# Patient Record
Sex: MALE | Race: WHITE | NOT HISPANIC OR LATINO | Employment: FULL TIME | URBAN - METROPOLITAN AREA
[De-identification: names, ages, dates, MRNs, and addresses within clinical notes are randomized per-mention and may not be internally consistent; named-entity substitution may affect disease eponyms.]

---

## 2017-01-03 ENCOUNTER — APPOINTMENT (OUTPATIENT)
Dept: GENERAL RADIOLOGY | Facility: CLINIC | Age: 47
End: 2017-01-03
Attending: FAMILY MEDICINE
Payer: COMMERCIAL

## 2017-01-03 ENCOUNTER — HOSPITAL ENCOUNTER (EMERGENCY)
Facility: CLINIC | Age: 47
Discharge: HOME | End: 2017-01-03
Attending: FAMILY MEDICINE | Admitting: FAMILY MEDICINE
Payer: COMMERCIAL

## 2017-01-03 VITALS
RESPIRATION RATE: 18 BRPM | SYSTOLIC BLOOD PRESSURE: 143 MMHG | OXYGEN SATURATION: 99 % | HEART RATE: 76 BPM | HEIGHT: 71 IN | TEMPERATURE: 97.3 F | BODY MASS INDEX: 29.26 KG/M2 | DIASTOLIC BLOOD PRESSURE: 98 MMHG | WEIGHT: 209 LBS

## 2017-01-03 DIAGNOSIS — S29.012A STRAIN OF THORACIC PARASPINAL MUSCLES EXCLUDING T1 AND T2 LEVELS, INITIAL ENCOUNTER: ICD-10-CM

## 2017-01-03 PROCEDURE — 99284 EMERGENCY DEPT VISIT MOD MDM: CPT | Performed by: FAMILY MEDICINE

## 2017-01-03 PROCEDURE — 99283 EMERGENCY DEPT VISIT LOW MDM: CPT

## 2017-01-03 PROCEDURE — 71101 X-RAY EXAM UNILAT RIBS/CHEST: CPT | Mod: TC,LT

## 2017-01-03 RX ORDER — CYCLOBENZAPRINE HCL 5 MG
5 TABLET ORAL 3 TIMES DAILY PRN
Qty: 20 TABLET | Refills: 0 | Status: SHIPPED | OUTPATIENT
Start: 2017-01-03 | End: 2017-01-09

## 2017-01-03 RX ORDER — KETOROLAC TROMETHAMINE 30 MG/ML
60 INJECTION, SOLUTION INTRAMUSCULAR; INTRAVENOUS ONCE
Status: DISCONTINUED | OUTPATIENT
Start: 2017-01-03 | End: 2017-01-03 | Stop reason: HOSPADM

## 2017-01-03 RX ORDER — IBUPROFEN 800 MG/1
800 TABLET, FILM COATED ORAL EVERY 8 HOURS PRN
Qty: 30 TABLET | Refills: 0 | Status: SHIPPED | OUTPATIENT
Start: 2017-01-03 | End: 2019-06-22

## 2017-01-03 RX ORDER — OXYCODONE AND ACETAMINOPHEN 5; 325 MG/1; MG/1
1-2 TABLET ORAL EVERY 4 HOURS PRN
Qty: 20 TABLET | Refills: 0 | Status: SHIPPED | OUTPATIENT
Start: 2017-01-03 | End: 2019-06-22

## 2017-01-03 ASSESSMENT — ENCOUNTER SYMPTOMS
BACK PAIN: 1
NECK PAIN: 0

## 2017-01-03 NOTE — ED PROVIDER NOTES
"  History     Chief Complaint   Patient presents with     Back Pain     The history is provided by the patient.     Bill Vogel is a 46 year old male who presents with posterior left rib pain. The patient fell on some ice around 1500 and landed on his left side. Since then he has been experiencing spasms of pain in his ribs that \"take his breath away\". He tried taking some of his wife's oxycodone yesterday and this improved his pain, but today it increased to a 10/10 even after taking oxycodone. His pain is not worsened by deep breaths or movement but seems to brought on when he talks to loudly. He denies any other injuries.     I have reviewed the Medications, Allergies, Past Medical and Surgical History, and Social History in the Epic system.    Patient Active Problem List   Diagnosis     Acute reaction to stress     Palpitations     Allergic rhinitis     Tobacco abuse     Radiculopathy of cervical spine     CARDIOVASCULAR SCREENING; LDL GOAL LESS THAN 160     Past Medical History   Diagnosis Date     Tobacco abuse        Past Surgical History   Procedure Laterality Date     Orthopedic surgery         Family History   Problem Relation Age of Onset     Depression Mother      Depression Sister      Breast Cancer Mother        Social History   Substance Use Topics     Smoking status: Current Every Day Smoker -- 1.00 packs/day for 22 years     Smokeless tobacco: Former User     Quit date: 12/02/2015     Alcohol Use: No        Immunization History   Administered Date(s) Administered     Influenza (IIV3) 11/13/2003     Influenza Vaccine IM 3yrs+ 4 Valent IIV4 10/28/2013     Mantoux 08/25/2008     TD (ADULT, 7+) 06/08/2006     TDAP (BOOSTRIX AGES 10-64) 10/28/2013          Allergies   Allergen Reactions     No Known Drug Allergies      Seasonal Allergies        Current Outpatient Prescriptions   Medication Sig Dispense Refill     OXYCODONE HCL PO Take 5 mg by mouth Taken some of his wife's       IBUPROFEN PO Take 800 mg " "by mouth every 8 hours as needed          Review of Systems   Musculoskeletal: Positive for back pain (posterior left ribs). Negative for neck pain.   All other systems reviewed and are negative.      Physical Exam   BP: (!) 143/98 mmHg  Pulse: 76  Temp: 97.3  F (36.3  C)  Resp: 18  Height: 180.3 cm (5' 11\")  Weight: 94.802 kg (209 lb)  SpO2: 99 %  Physical Exam   Constitutional: No distress.   HENT:   Head: Normocephalic and atraumatic.   Cardiovascular: Normal rate, regular rhythm, normal heart sounds and intact distal pulses.    Pulmonary/Chest: Effort normal and breath sounds normal. No respiratory distress. He has no wheezes. He has no rales.   Musculoskeletal: Normal range of motion. He exhibits no edema or tenderness.        Cervical back: Normal.        Thoracic back: Normal.        Lumbar back: Normal.   Skin: Skin is warm. No rash noted. He is not diaphoretic. No erythema.   Nursing note and vitals reviewed.      ED Course   Procedures        Results for orders placed or performed during the hospital encounter of 01/03/17 (from the past 24 hour(s))   Ribs XR, unilat 3 views + PA chest,  left    Narrative    XR RIBS & CHEST LT 3VW 1/3/2017 2:17 PM     HISTORY: fall, left rib pain    COMPARISON: None      Impression    IMPRESSION: The heart size and pulmonary vasculature are normal. No  evidence of pneumothorax. There are no acute infiltrates. No displaced  rib fractures are identified on the rib detail views.    GIA MEREDITH MD     Medications - No data to display      X-rays are negative for any fractures.  I think the patient most likely savvy muscle spasms from thoracic muscle strain.  Will discharge patient home with some Flexeril and Percocet.  Patient will continue to apply ice to the tender areas often as possible.  He will also use ibuprofen to help with pain too.    Assessments & Plan (with Medical Decision Making)  thoracic muscle strain      I have reviewed the nursing notes.    I have reviewed " the findings, diagnosis, plan and need for follow up with the patient.    Discharge Medication List as of 1/3/2017  2:51 PM      START taking these medications    Details   oxyCODONE-acetaminophen (PERCOCET) 5-325 MG per tablet Take 1-2 tablets by mouth every 4 hours as needed for pain, Disp-20 tablet, R-0, Local Print      cyclobenzaprine (FLEXERIL) 5 MG tablet Take 1 tablet (5 mg) by mouth 3 times daily as needed for muscle spasms, Disp-20 tablet, R-0, Local Print             Final diagnoses:   Strain of thoracic paraspinal muscles excluding T1 and T2 levels, initial encounter     This document serves as a record of services personally performed by Oh Centeno MD. It was created on their behalf by Charis Valdez, a trained medical scribe. The creation of this record is based on the provider's personal observations and the statements of the patient. This document has been checked and approved by the attending provider.   Note: Chart documentation done in part with Dragon Voice Recognition software. Although reviewed after completion, some word and grammatical errors may remain.  1/3/2017   [facility] EMERGENCY DEPARTMENT      Oh Centeno MD  01/03/17 5679

## 2017-01-03 NOTE — ED AVS SNAPSHOT
[facility] Emergency Department    [address] [place] [address]    Phone:  [phone]    Fax:  [phone]                                       Bill Vogel   MRN: [id number]    Department:  [facility] Emergency Department   Date of Visit:  1/3/2017           Patient Information     Date Of Birth          1970        Your diagnoses for this visit were:     Strain of thoracic paraspinal muscles excluding T1 and T2 levels, initial encounter        You were seen by Oh Centeno MD.      Follow-up Information     Follow up with Norris Fay MD. Schedule an appointment as soon as possible for a visit in 3 days.    Specialty:  Family Practice    Why:  If not improving.    Contact information:    [facility]  [address] [place] [address]  [phone]          Discharge Instructions         Back Sprain or Strain    Injury to the muscles (strain) or ligaments (sprain) around the spine can be troubling. Injury may occur after a sudden forceful twisting or bending force such as in a car accident, after a simple awkward movement, or after lifting something heavy with poor body positioning. In any case, muscle spasm is often present and adds to the pain.  Thankfully, most people feel better in 1 to 2 weeks, and most of the rest in 1 to 2 months. Most people can remain active. Unless you had a forceful physical injury such as a car accident or fall, X-rays are usually not ordered for the first evaluation of a back sprain or strain. If pain continues and does not respond to medical treatment, your healthcare provider may order X-rays and other tests.  Home care  The following guidelines will help you care for your injury at home:    When in bed, try to find a comfortable position. A firm mattress is best. Try lying flat on your back with pillows under your knees. You can also try lying on your side with your knees bent up toward your chest and a  pillow between your knees.    Don't sit for long periods. Try not to take long car rides or take other trips that have you sitting for a long time. This puts more stress on the lower back than standing or walking.    During the first 24 to 72 hours after an injury or flare-up, apply an ice pack to the painful area for 20 minutes. Then remove it for 20 minutes. Do this for 60 to 90 minutes, or several times a day, This will reduce swelling and pain. Be sure to wrap the ice pack in a thin towel or plastic to protect your skin.    You can start with ice, then switch to heat. Heat from a hot shower, hot bath, or heating pad reduces swelling and pain and works well for muscle spasms. Put heat on the painful area for 20 minutes, then remove for 20 minutes. Do this for 60 to 90 minutes, or several times a day. Do not use a heating pad while sleeping. It can burn the skin.    You can alternate the ice and heat. Talk with your healthcare provider to find out the best treatment or therapy for your back pain.    Therapeutic massage will help relax the back muscles without stretching them.    Be aware of safe lifting methods. Do not lift anything over 15 pounds until all of the pain is gone.  Medicines  Talk to your healthcare provider before using medicines, especially if you have other health problems or are taking other medicines.    You may use acetaminophen or ibuprofen to control pain, unless another pain medicine was prescribed. If you have chronic conditions like diabetes, liver or kidney disease, stomach ulcers, or gastrointestinal bleeding, or are taking blood-thinner medicines, talk with your doctor before taking any medicines.    Be careful if you are given prescription medicines, narcotics, or medicine for muscle spasm. They can cause drowsiness, and affect your coordination, reflexes, and judgment. Do not drive or operate heavy machinery.  Follow-up care  Follow up with your healthcare provider or this facility as  advised. You may need physical therapy or more tests if your symptoms get worse.  If you had X-rays today, they didn t show any broken bones, breaks, or fractures. Sometimes fractures don t show up on the first X-ray. Bruises and sprains can sometimes hurt as much as a fracture. These injuries can take time to heal completely. If your symptoms don t improve or they get worse, talk with your healthcare provider. You may need a repeat X-ray.  Call 911  Call for emergency care if any of the following occur:    Trouble breathing    Confused    Very drowsy or trouble awakening    Fainting or loss of consciousness    Rapid or very slow heart rate    Loss of bowel or bladder control  When to seek medical advice  Call your healthcare provider right away if any of the following occur:    Pain gets worse or spreads to your arms or legs    Weakness or numbness in one or both arms or legs    Numbness in the groin or genital area    0590-6095 [employer]. [address] [place] [address]. All rights reserved. This information is not intended as a substitute for professional medical care. Always follow your healthcare professional's instructions.          24 Hour Appointment Hotline       To make an appointment at any [facility], call [phone] ([phone]). If you don't have a family doctor or clinic, we will help you find one. [facility] clinics are conveniently located to serve the needs of you and your family.             Review of your medicines      START taking        Dose / Directions Last dose taken    cyclobenzaprine 5 MG tablet   Commonly known as:  FLEXERIL   Dose:  5 mg   Quantity:  20 tablet        Take 1 tablet (5 mg) by mouth 3 times daily as needed for muscle spasms   Refills:  0        oxyCODONE-acetaminophen 5-325 MG per tablet   Commonly known as:  PERCOCET   Dose:  1-2 tablet   Quantity:  20 tablet        Take 1-2 tablets by mouth every 4 hours as needed for pain   Refills:   0          CONTINUE these medicines which may have CHANGED, or have new prescriptions. If we are uncertain of the size of tablets/capsules you have at home, strength may be listed as something that might have changed.        Dose / Directions Last dose taken    ibuprofen 800 MG tablet   Commonly known as:  ADVIL/MOTRIN   Dose:  800 mg   What changed:    - medication strength  - reasons to take this   Quantity:  30 tablet        Take 1 tablet (800 mg) by mouth every 8 hours as needed for pain   Refills:  0          Our records show that you are taking the medicines listed below. If these are incorrect, please call your family doctor or clinic.        Dose / Directions Last dose taken    OXYCODONE HCL PO   Dose:  5 mg        Take 5 mg by mouth Taken some of his wife's   Refills:  0                Prescriptions were sent or printed at these locations (3 Prescriptions)                   [facility] [place], [place] - [address]    [address] , [facility] [address]    Telephone:  [phone]   Fax:  [phone]   Hours:                  Printed at Department/Unit printer (3 of 3)         oxyCODONE-acetaminophen (PERCOCET) 5-325 MG per tablet               ibuprofen (ADVIL/MOTRIN) 800 MG tablet               cyclobenzaprine (FLEXERIL) 5 MG tablet                Procedures and tests performed during your visit     Ribs XR, unilat 3 views + PA chest,  left      Orders Needing Specimen Collection     None      Pending Results     No orders found from 1/2/2017 to 1/4/2017.            Pending Culture Results     No orders found from 1/2/2017 to 1/4/2017.            Thank you for choosing [facility]       Thank you for choosing [facility] for your care. Our goal is always to provide you with excellent care. Hearing back from our patients is one way we can continue to improve our services. Please take a few minutes to complete the written survey that you may receive in the mail after you visit with us. Thank  you!        [employer] Information     [employer] gives you secure access to your electronic health record. If you see a primary care provider, you can also send messages to your care team and make appointments. If you have questions, please call your primary care clinic.  If you do not have a primary care provider, please call [phone] and they will assist you.        Care EveryWhere ID     This is your Care EveryWhere ID. This could be used by other organizations to access your [place] medical records  [id number]        After Visit Summary       This is your record. Keep this with you and show to your community pharmacist(s) and doctor(s) at your next visit.

## 2017-01-03 NOTE — DISCHARGE INSTRUCTIONS

## 2017-01-03 NOTE — ED AVS SNAPSHOT
[facility] Emergency Department    [address] [place] [address]    Phone:  [phone]    Fax:  [phone]                                       Bill Vogel   MRN: [id number]    Department:  [facility] Emergency Department   Date of Visit:  1/3/2017           After Visit Summary Signature Page     I have received my discharge instructions, and my questions have been answered. I have discussed any challenges I see with this plan with the nurse or doctor.    ..........................................................................................................................................  Patient/Patient Representative Signature      ..........................................................................................................................................  Patient Representative Print Name and Relationship to Patient    ..................................................               ................................................  Date                                            Time    ..........................................................................................................................................  Reviewed by Signature/Title    ...................................................              ..............................................  Date                                                            Time

## 2017-01-03 NOTE — ED NOTES
He slipped and fell on the ice yesterday at 1500, injuring his L ribs.  He has used some of his wife's leftover oxycodone for pain control, last at 1300 and the pain is still 10/10

## 2018-06-24 ENCOUNTER — OFFICE VISIT (OUTPATIENT)
Dept: URGENT CARE | Facility: RETAIL CLINIC | Age: 48
End: 2018-06-24
Payer: COMMERCIAL

## 2018-06-24 VITALS
TEMPERATURE: 97.9 F | HEART RATE: 75 BPM | SYSTOLIC BLOOD PRESSURE: 119 MMHG | OXYGEN SATURATION: 97 % | DIASTOLIC BLOOD PRESSURE: 78 MMHG

## 2018-06-24 DIAGNOSIS — H10.32 ACUTE CONJUNCTIVITIS OF LEFT EYE, UNSPECIFIED ACUTE CONJUNCTIVITIS TYPE: Primary | ICD-10-CM

## 2018-06-24 PROCEDURE — 99213 OFFICE O/P EST LOW 20 MIN: CPT | Performed by: NURSE PRACTITIONER

## 2018-06-24 NOTE — MR AVS SNAPSHOT
After Visit Summary   6/24/2018    Bill Vogel    MRN: [id number]           Patient Information     Date Of Birth          1970        Visit Information        Provider Department      6/24/2018 9:40 AM Alec Bartlett APRN [facility]        Today's Diagnoses     Acute conjunctivitis of left eye, unspecified acute conjunctivitis type    -  1       Follow-ups after your visit        Who to contact     You can reach your care team any time of the day by calling [phone].  Notification of test results:  If you have an abnormal lab result, we will notify you by phone as soon as possible.         Additional Information About Your Visit        MyChart Information     [employer] gives you secure access to your electronic health record. If you see a primary care provider, you can also send messages to your care team and make appointments. If you have questions, please call your primary care clinic.  If you do not have a primary care provider, please call [phone] and they will assist you.        Care EveryWhere ID     This is your Care EveryWhere ID. This could be used by other organizations to access your [place] medical records  [id number]        Your Vitals Were     Pulse Temperature Pulse Oximetry             75 97.9  F (36.6  C) (Tympanic) 97%          Blood Pressure from Last 3 Encounters:   06/24/18 119/78   01/03/17 (!) 143/98   12/04/15 112/77    Weight from Last 3 Encounters:   01/03/17 209 lb (94.8 kg)   04/14/14 205 lb 12.8 oz (93.4 kg)   10/28/13 204 lb (92.5 kg)              Today, you had the following     No orders found for display         Today's Medication Changes          These changes are accurate as of 6/24/18  9:44 AM.  If you have any questions, ask your nurse or doctor.               Start taking these medicines.        Dose/Directions    neomycin-polymixin-dexamethasone ophthalmic ointment   Commonly known as:  MAXITROL   Used for:  Acute  conjunctivitis of left eye, unspecified acute conjunctivitis type   Started by:  Alec Bartlett, APRN CNP        Instil 1/2 inch ribbon of ointment into affected eye every 4-6 hours WA for 7 to 10 days.   Quantity:  3.5 g   Refills:  0            Where to get your medicines      These medications were sent to [address]  [address], [facility] [address]     Phone:  [phone]     neomycin-polymixin-dexamethasone ophthalmic ointment                Primary Care Provider Office Phone # Fax #    Norris Fay [phone] [address]   [facility] [address]        Equal Access to Services     MOMO GRAMAJO : Hadii sadia michael hadasho Soomaali, waaxda luqadaha, qaybta kaalmada adeegyada, mando christianson. So [facility] [phone].    ATENCIÓN: Si habla español, tiene a larson disposición servicios gratuitos de asistencia lingüística. Llame al [phone].    We comply with applicable federal civil rights laws and [place] laws. We do not discriminate on the basis of race, color, national origin, age, disability, sex, sexual orientation, or gender identity.            Thank you!     Thank you for choosing [facility]  for your care. Our goal is always to provide you with excellent care. Hearing back from our patients is one way we can continue to improve our services. Please take a few minutes to complete the written survey that you may receive in the mail after your visit with us. Thank you!             Your Updated Medication List - Protect others around you: Learn how to safely use, store and throw away your medicines at www.disposemymeds.org.          This list is accurate as of 6/24/18  9:44 AM.  Always use your most recent med list.                   Brand Name Dispense Instructions for use Diagnosis    ibuprofen 800 MG tablet    ADVIL/MOTRIN    30 tablet    Take 1 tablet (800 mg) by mouth every 8 hours as  needed for pain        neomycin-polymixin-dexamethasone ophthalmic ointment    MAXITROL    3.5 g    Instil 1/2 inch ribbon of ointment into affected eye every 4-6 hours WA for 7 to 10 days.    Acute conjunctivitis of left eye, unspecified acute conjunctivitis type       OXYCODONE HCL PO      Take 5 mg by mouth Taken some of his wife's        oxyCODONE-acetaminophen 5-325 MG per tablet    PERCOCET    20 tablet    Take 1-2 tablets by mouth every 4 hours as needed for pain

## 2018-06-24 NOTE — PROGRESS NOTES
SUBJECTIVE:  Bill Vogel is a 48 year old male who presents complaining of mild and moderate left eye discharge, mattering, redness, eyelid swelling, itching for 3 day(s).   Onset/timing: gradual, worsening.    Associated Signs and Symptoms: none  Treatment measures tried include: none  Contact wearer : No    Past Medical History:   Diagnosis Date     Tobacco abuse      Current Outpatient Prescriptions   Medication Sig Dispense Refill     ibuprofen (ADVIL/MOTRIN) 800 MG tablet Take 1 tablet (800 mg) by mouth every 8 hours as needed for pain (Patient not taking: Reported on 6/24/2018) 30 tablet 0     OXYCODONE HCL PO Take 5 mg by mouth Taken some of his wife's       oxyCODONE-acetaminophen (PERCOCET) 5-325 MG per tablet Take 1-2 tablets by mouth every 4 hours as needed for pain (Patient not taking: Reported on 6/24/2018) 20 tablet 0     History   Smoking Status     Current Every Day Smoker     Packs/day: 1.00     Years: 22.00   Smokeless Tobacco     Former User     Quit date: 12/2/2015     ROS:  Review of systems negative except as stated above.    OBJECTIVE:  /78 (BP Location: Right arm, Patient Position: Chair, Cuff Size: Adult Regular)  Pulse 75  Temp 97.9  F (36.6  C) (Tympanic)  SpO2 97%  General: no acute distress  Eye exam: right eye normal lid, conjunctiva, cornea, pupil and fundus, left eye abnormal findings: conjunctivitis with erythema.  Ears: normal canals, TMs bilaterally, normal TM mobility  Nose: NORMAL - no drainage, turbinates normal in size.  Neck: supple, non-tender, free range of motion, no adenopathy  Heart: NORMAL - regular rate and rhythm without murmur.  Lungs: normal and clear to auscultation    ASSESSMENT:  Acute conjunctivitis of left eye, unspecified acute conjunctivitis type      PLAN:  Current Outpatient Prescriptions   Medication     neomycin-polymixin-dexamethasone (MAXITROL) ophthalmic ointment     ibuprofen (ADVIL/MOTRIN) 800 MG tablet     OXYCODONE HCL PO      oxyCODONE-acetaminophen (PERCOCET) 5-325 MG per tablet     No current facility-administered medications for this visit.      Before administering antibiotic, remove all the pus from the eye with warm water & a wipe.  The yellowish discharge should clear up in 72 hours. The red eyes may continue for several more days.  Patient is instructed on hygiene for conjunctivitis: discard her own kleenex, avoid rubbing unaffected eye(rubbing eyes can make the infection last longer), wash hands frequently, change linens which become contaminated.  Touching eyes also puts a lot of germs on hands & can spread the infection.  After using eye drops for 24 hours, and if the pus is minimal, children can return to day care or school as they should no longer be Contagious.  If treated with an oral antibiotic the wait time to return to  is 48 hours.  Be seen by PCP or even go to the ED if outer eyelids become very red or swollen, eye becomes painful or vision becomes blurred.    F/U with PCP if infection isn't cleared up after 3 days of treatment or an earache develops.    Alec Bartlett MSN, APRN, Family NP-C  [facility] Care  June 24, 2018

## 2018-08-17 ENCOUNTER — OFFICE VISIT (OUTPATIENT)
Dept: FAMILY MEDICINE | Facility: CLINIC | Age: 48
End: 2018-08-17
Payer: COMMERCIAL

## 2018-08-17 VITALS
TEMPERATURE: 97.9 F | RESPIRATION RATE: 16 BRPM | SYSTOLIC BLOOD PRESSURE: 120 MMHG | BODY MASS INDEX: 29.79 KG/M2 | DIASTOLIC BLOOD PRESSURE: 62 MMHG | OXYGEN SATURATION: 97 % | HEART RATE: 85 BPM | WEIGHT: 213.6 LBS

## 2018-08-17 DIAGNOSIS — M54.42 ACUTE LEFT-SIDED LOW BACK PAIN WITH LEFT-SIDED SCIATICA: Primary | ICD-10-CM

## 2018-08-17 PROCEDURE — 99213 OFFICE O/P EST LOW 20 MIN: CPT | Performed by: FAMILY MEDICINE

## 2018-08-17 RX ORDER — HYDROCODONE BITARTRATE AND ACETAMINOPHEN 5; 325 MG/1; MG/1
1 TABLET ORAL EVERY 4 HOURS PRN
Qty: 20 TABLET | Refills: 0 | Status: SHIPPED | OUTPATIENT
Start: 2018-08-17 | End: 2019-06-22

## 2018-08-17 RX ORDER — CYCLOBENZAPRINE HCL 10 MG
10 TABLET ORAL
Qty: 14 TABLET | Refills: 1 | Status: SHIPPED | OUTPATIENT
Start: 2018-08-17 | End: 2019-06-22

## 2018-08-17 RX ORDER — PREDNISONE 20 MG/1
TABLET ORAL
Qty: 18 TABLET | Refills: 1 | Status: SHIPPED | OUTPATIENT
Start: 2018-08-17 | End: 2019-06-22

## 2018-08-17 ASSESSMENT — PAIN SCALES - GENERAL: PAINLEVEL: MILD PAIN (2)

## 2018-08-17 NOTE — PROGRESS NOTES
SUBJECTIVE:   Bill Vogel is a 48 year old male who presents to clinic today for the following health issues:      Back Pain       Duration: x 1 week ago        Specific cause: After going to Chiropractor     Description:   Location of pain: low back both  Character of pain: sharp, dull ache and stabbing  Pain radiation:radiates into the left leg  New numbness or weakness in legs, not attributed to pain:  no     Intensity: Currently 2/10    History:   Pain interferes with job: YES, yesterday  History of back problems: no prior back problems  Any previous MRI or X-rays: None  Sees a specialist for back pain:  No  Therapies tried without relief: chiropractor    Alleviating factors:   Improved by: none      Precipitating factors:  Worsened by: Nothing    Functional and Psychosocial Screen (Basilio STarT Back):      Not performed today          Accompanying Signs & Symptoms:  Risk of Fracture:  None  Risk of Cauda Equina:  None  Risk of Infection:  None  Risk of Cancer:  None  Risk of Ankylosing Spondylitis:  Onset at age <35, male, AND morning back stiffness.                      Problem list and histories reviewed & adjusted, as indicated.  Additional history: as documented        Reviewed and updated as needed this visit by clinical staff       Reviewed and updated as needed this visit by Provider        SUBJECTIVE:  Bill  is a 48 year old male who presents for: Onset of back pain about a week ago.  He noticed it worsening after going to a chiropractor for an adjustment for sinusitis.  No significant history of back pain this is lower back left side radiates into his left leg.  Very difficult time getting out of bed this morning.  Trouble sitting and getting out of car.  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    Past Medical History:   Diagnosis Date     Tobacco abuse      Past Surgical History:   Procedure Laterality Date     ORTHOPEDIC SURGERY       Social History    Substance Use Topics     Smoking status: Current Every Day Smoker     Packs/day: 1.00     Years: 22.00     Smokeless tobacco: Former User     Quit date: 12/2/2015     Alcohol use No     Current Outpatient Prescriptions   Medication Sig Dispense Refill     cyclobenzaprine (FLEXERIL) 10 MG tablet Take 1 tablet (10 mg) by mouth nightly as needed for muscle spasms 14 tablet 1     HYDROcodone-acetaminophen (NORCO) 5-325 MG per tablet Take 1 tablet by mouth every 4 hours as needed for pain 20 tablet 0     predniSONE (DELTASONE) 20 MG tablet Take 3 tabs daily for 3 days, then 2 tabs daily for 3 days, then 1 tab daily for 3 days 18 tablet 1     ibuprofen (ADVIL/MOTRIN) 800 MG tablet Take 1 tablet (800 mg) by mouth every 8 hours as needed for pain (Patient not taking: Reported on 6/24/2018) 30 tablet 0     neomycin-polymixin-dexamethasone (MAXITROL) ophthalmic ointment Instil 1/2 inch ribbon of ointment into affected eye every 4-6 hours WA for 7 to 10 days. (Patient not taking: Reported on 8/17/2018) 3.5 g 0     OXYCODONE HCL PO Take 5 mg by mouth Taken some of his wife's       oxyCODONE-acetaminophen (PERCOCET) 5-325 MG per tablet Take 1-2 tablets by mouth every 4 hours as needed for pain (Patient not taking: Reported on 6/24/2018) 20 tablet 0       REVIEW OF SYSTEMS:   5 point ROS negative except as noted above in HPI, including Gen., Resp, CV, GI &  system review.     OBJECTIVE:  Vitals: /62  Pulse 85  Temp 97.9  F (36.6  C) (Temporal)  Resp 16  Wt 213 lb 9.6 oz (96.9 kg)  SpO2 97%  BMI 29.79 kg/m2  BMI= Body mass index is 29.79 kg/(m^2).  He is alert and appears comfortable.  His spine is straight.  Gets up fairly slowly from sitting to standing position and straighten up.  Extension seems okay but forward flexion is limited to about 15 .  Tender in the left lower lumbar region since paraspinous muscles.  Straight leg raising is negative.  Gait is regular once he gets moving.    ASSESSMENT:  Lumbar Back  pain with left-sided sciatica    PLAN:  We will hit him with some prednisone Flexeril and Vicodin.  If not improving may have to do some x-rays and consider physical therapy.  He will follow-up as needed next week.        Chirag Augustine MD  [facility]

## 2018-08-17 NOTE — MR AVS SNAPSHOT
After Visit Summary   8/17/2018    Bill Vogel    MRN: [id number]           Patient Information     Date Of Birth          1970        Visit Information        Provider Department      8/17/2018 3:40 PM Chirag Augustine MD [facility]        Today's Diagnoses     Acute left-sided low back pain with left-sided sciatica    -  1       Follow-ups after your visit        Who to contact     If you have questions or need follow up information about today's clinic visit or your schedule please contact [facility] directly at [phone].  Normal or non-critical lab and imaging results will be communicated to you by Freespe[employer]art, letter or phone within 4 business days after the clinic has received the results. If you do not hear from us within 7 days, please contact the clinic through [employer] or phone. If you have a critical or abnormal lab result, we will notify you by phone as soon as possible.  Submit refill requests through [email or web address] or call your pharmacy and they will forward the refill request to us. Please allow 3 business days for your refill to be completed.          Additional Information About Your Visit        MyChart Information     [email or web address] gives you secure access to your electronic health record. If you see a primary care provider, you can also send messages to your care team and make appointments. If you have questions, please call your primary care clinic.  If you do not have a primary care provider, please call [phone] and they will assist you.        Care EveryWhere ID     This is your Care EveryWhere ID. This could be used by other organizations to access your [facility] medical records  [id number]        Your Vitals Were     Pulse Temperature Respirations Pulse Oximetry BMI (Body Mass Index)       85 97.9  F (36.6  C) (Temporal) 16 97% 29.79 kg/m2        Blood Pressure from Last 3 Encounters:   08/17/18 120/62   06/24/18 119/78   01/03/17 (!) 143/98     Weight from Last 3 Encounters:   08/17/18 213 lb 9.6 oz (96.9 kg)   01/03/17 209 lb (94.8 kg)   04/14/14 205 lb 12.8 oz (93.4 kg)              Today, you had the following     No orders found for display         Today's Medication Changes          These changes are accurate as of 8/17/18 11:59 PM.  If you have any questions, ask your nurse or doctor.               Start taking these medicines.        Dose/Directions    cyclobenzaprine 10 MG tablet   Commonly known as:  FLEXERIL   Used for:  Acute left-sided low back pain with left-sided sciatica   Started by:  Chirag Augustine MD        Dose:  10 mg   Take 1 tablet (10 mg) by mouth nightly as needed for muscle spasms   Quantity:  14 tablet   Refills:  1       HYDROcodone-acetaminophen 5-325 MG per tablet   Commonly known as:  NORCO   Used for:  Acute left-sided low back pain with left-sided sciatica   Started by:  Chirag Augustine MD        Dose:  1 tablet   Take 1 tablet by mouth every 4 hours as needed for pain   Quantity:  20 tablet   Refills:  0       predniSONE 20 MG tablet   Commonly known as:  DELTASONE   Used for:  Acute left-sided low back pain with left-sided sciatica   Started by:  Chirag Augustine MD        Take 3 tabs daily for 3 days, then 2 tabs daily for 3 days, then 1 tab daily for 3 days   Quantity:  18 tablet   Refills:  1            Where to get your medicines      These medications were sent to [facility], [place] - [address]  [address], [place] [address]     Phone:  [phone]     cyclobenzaprine 10 MG tablet    predniSONE 20 MG tablet         Some of these will need a paper prescription and others can be bought over the counter.  Ask your nurse if you have questions.     Bring a paper prescription for each of these medications     HYDROcodone-acetaminophen 5-325 MG per tablet               Information about OPIOIDS     PRESCRIPTION OPIOIDS: WHAT YOU NEED TO KNOW   We gave you an opioid (narcotic)  pain medicine. It is important to manage your pain, but opioids are not always the best choice. You should first try all the other options your care team gave you. Take this medicine for as short a time (and as few doses) as possible.    Some activities can increase your pain, such as bandage changes or therapy sessions. It may help to take your pain medicine 30 to 60 minutes before these activities. Reduce your stress by getting enough sleep, working on hobbies you enjoy and practicing relaxation or meditation. Talk to your care team about ways to manage your pain beyond prescription opioids.    These medicines have risks:    DO NOT drive when on new or higher doses of pain medicine. These medicines can affect your alertness and reaction times, and you could be arrested for driving under the influence (DUI). If you need to use opioids long-term, talk to your care team about driving.    DO NOT operate heavy machinery    DO NOT do any other dangerous activities while taking these medicines.    DO NOT drink any alcohol while taking these medicines.     If the opioid prescribed includes acetaminophen, DO NOT take with any other medicines that contain acetaminophen. Read all labels carefully. Look for the word  acetaminophen  or  Tylenol.  Ask your pharmacist if you have questions or are unsure.    You can get addicted to pain medicines, especially if you have a history of addiction (chemical, alcohol or substance dependence). Talk to your care team about ways to reduce this risk.    All opioids tend to cause constipation. Drink plenty of water and eat foods that have a lot of fiber, such as fruits, vegetables, prune juice, apple juice and high-fiber cereal. Take a laxative (Miralax, milk of magnesia, Colace, Senna) if you don t move your bowels at least every other day. Other side effects include upset stomach, sleepiness, dizziness, throwing up, tolerance (needing more of the medicine to have the same effect),  physical dependence and slowed breathing.    Store your pills in a secure place, locked if possible. We will not replace any lost or stolen medicine. If you don t finish your medicine, please throw away (dispose) as directed by your pharmacist. The [place] Pollution Control Agency has more information about safe disposal: [email or web address]-medications         Primary Care Provider Office Phone # Fax #    Norris Fay [phone] [address] [place] [address]        Equal Access to Services     [facility]: Hadii aad ku hadasho Soomaali, waaxda luqadaha, qaybta kaalmada adeegyada, mando [facility]oses . So [facility] [phone].    ATENCIÓN: Si habla español, tiene a larson disposición servicios gratuitos de asistencia lingüística. [facility] [phone].    We comply with applicable federal civil rights laws and [place] laws. We do not discriminate on the basis of race, color, national origin, age, disability, sex, sexual orientation, or gender identity.            Thank you!     Thank you for choosing [facility]  for your care. Our goal is always to provide you with excellent care. Hearing back from our patients is one way we can continue to improve our services. Please take a few minutes to complete the written survey that you may receive in the mail after your visit with us. Thank you!             Your Updated Medication List - Protect others around you: Learn how to safely use, store and throw away your medicines at www.disposemymeds.org.          This list is accurate as of 8/17/18 11:59 PM.  Always use your most recent med list.                   Brand Name Dispense Instructions for use Diagnosis    cyclobenzaprine 10 MG tablet    FLEXERIL    14 tablet    Take 1 tablet (10 mg) by mouth nightly as needed for muscle spasms    Acute left-sided low back pain with left-sided sciatica        HYDROcodone-acetaminophen 5-325 MG per tablet    NORCO    20 tablet    Take 1 tablet by mouth every 4 hours as needed for pain    Acute left-sided low back pain with left-sided sciatica       ibuprofen 800 MG tablet    ADVIL/MOTRIN    30 tablet    Take 1 tablet (800 mg) by mouth every 8 hours as needed for pain        neomycin-polymixin-dexamethasone ophthalmic ointment    MAXITROL    3.5 g    Instil 1/2 inch ribbon of ointment into affected eye every 4-6 hours WA for 7 to 10 days.    Acute conjunctivitis of left eye, unspecified acute conjunctivitis type       OXYCODONE HCL PO      Take 5 mg by mouth Taken some of his wife's        oxyCODONE-acetaminophen 5-325 MG per tablet    PERCOCET    20 tablet    Take 1-2 tablets by mouth every 4 hours as needed for pain        predniSONE 20 MG tablet    DELTASONE    18 tablet    Take 3 tabs daily for 3 days, then 2 tabs daily for 3 days, then 1 tab daily for 3 days    Acute left-sided low back pain with left-sided sciatica

## 2019-06-22 ENCOUNTER — OFFICE VISIT (OUTPATIENT)
Dept: URGENT CARE | Facility: RETAIL CLINIC | Age: 49
End: 2019-06-22
Payer: COMMERCIAL

## 2019-06-22 VITALS — HEART RATE: 62 BPM | SYSTOLIC BLOOD PRESSURE: 120 MMHG | DIASTOLIC BLOOD PRESSURE: 85 MMHG | TEMPERATURE: 97.9 F

## 2019-06-22 DIAGNOSIS — L23.7 CONTACT DERMATITIS DUE TO POISON IVY: Primary | ICD-10-CM

## 2019-06-22 PROCEDURE — 99213 OFFICE O/P EST LOW 20 MIN: CPT | Performed by: INTERNAL MEDICINE

## 2019-06-22 RX ORDER — TRIAMCINOLONE ACETONIDE 1 MG/G
CREAM TOPICAL 2 TIMES DAILY
Qty: 45 G | Refills: 0 | Status: SHIPPED | OUTPATIENT
Start: 2019-06-22 | End: 2019-06-29

## 2019-06-22 NOTE — PROGRESS NOTES
[facility] [place]        Vernon Simpson MD, MPH  06/22/2019    Bill Vogel is a pleasant  49 year old male seen for a moderately pruritic rash involving forearms for 2 weeks.  Possible exposure to poison ivy.There has not been any change in the diet or new detergent, soap or toiletries.  No new medications, no insect bite. No fever or chills and no recent illness. No cough or shortness of breath or wheezing is referred.  No nausea, vomiting or diarrhea.  No arthralgia or myalgia.  No tongue, lip or mouth swelling or swallowing problems are referred.    Physical Exam   /85   Pulse 62   Temp 97.9  F (36.6  C) (Oral)      Constitutional: Patient is in no distress The patient is pleasant and cooperative.   HEENT: Head:  Head is atraumatic, normocephalic.    Eyes: Pupils are equal, round and reactive to light and accomodation.  Sclera is non-icteric. No conjunctival injection, or exudate noted. Extraocular motion is intact. Visual acuity is intact bilaterally.  Ears:  External acoustic canals are patent and clear.  There is no erythema and bulging( exudate)  of the ( R/L ) tympanic membrane(s ).   Nose:  No Nasal congestion w/o drainage or mucosal ulceration is noted.  Throat:  Oral mucosa is moist.  No oral lesions are noted.  No posterior pharyngeal hyperemia or exudate noted.     Neck Supple.  There is no cervical lymphadenopathy.  No nuchal rigidity noted.  There is no meningismus.     Cardiovascular: Heart is regular to rate and rhythm.  No murmur is noted.     Chest. Chest Symmetrical, no soft tissues, swelling, or tenderness upon palpation   Lungs: Clear in the anterior and posterior pulmonary fields.   Abdomen: Soft and non-tender.    Back No flank tenderness is noted.   Extremeties No edema, no calf tenderness.   Neuro: No focal deficit.   Skin No petechiae or purpura is noted.  There are linear erythematous skin excoriations involving forearms, nearly circumferentially.  There is no pustules, no papules, and no vesicular eruption. No dermatomal pattern of distribution of the rash. No crusty or exudative skin lesions. No ulcerations. No lymphangitis.        Mood Normal         Assessment & Plan      Contact dermatitis due to poison ivy  Advised to avoid offending plants.  - triamcinolone (KENALOG) 0.1 % external cream; Apply topically 2 times daily for 7 days  Dispense: 45 g; Refill: 0    Follow-up with your PCP in 4-5 days, earlier if symptoms worsen..  Advised to use plenty of moisturizing cream.  Advised to avoid hot baths/showers.  Advised to avoid irritating soap/detergents.  Avoid warm environments.  Use Benedryl every 8 hours as needed for pruritis ( discussed the sedative nature of benadryl and advised patient to avoid operating equipment/machinery and caution with driving is advised ).  Please wash the skin with soap and water daily.

## 2019-12-08 ENCOUNTER — HEALTH MAINTENANCE LETTER (OUTPATIENT)
Age: 49
End: 2019-12-08

## 2021-01-09 ENCOUNTER — HEALTH MAINTENANCE LETTER (OUTPATIENT)
Age: 51
End: 2021-01-09

## 2021-08-27 ENCOUNTER — OFFICE VISIT (OUTPATIENT)
Dept: FAMILY MEDICINE | Facility: CLINIC | Age: 51
End: 2021-08-27
Payer: COMMERCIAL

## 2021-08-27 VITALS
OXYGEN SATURATION: 95 % | HEIGHT: 72 IN | DIASTOLIC BLOOD PRESSURE: 86 MMHG | SYSTOLIC BLOOD PRESSURE: 134 MMHG | HEART RATE: 100 BPM | BODY MASS INDEX: 30.66 KG/M2 | RESPIRATION RATE: 20 BRPM | WEIGHT: 226.4 LBS | TEMPERATURE: 98.2 F

## 2021-08-27 DIAGNOSIS — Z11.4 ENCOUNTER FOR SCREENING FOR HUMAN IMMUNODEFICIENCY VIRUS (HIV): ICD-10-CM

## 2021-08-27 DIAGNOSIS — Z72.0 TOBACCO ABUSE: ICD-10-CM

## 2021-08-27 DIAGNOSIS — Z11.59 NEED FOR HEPATITIS C SCREENING TEST: ICD-10-CM

## 2021-08-27 DIAGNOSIS — L82.1 SEBORRHEIC KERATOSIS: ICD-10-CM

## 2021-08-27 DIAGNOSIS — Z00.00 ROUTINE GENERAL MEDICAL EXAMINATION AT A HEALTH CARE FACILITY: Primary | ICD-10-CM

## 2021-08-27 DIAGNOSIS — E66.9 OBESITY (BMI 30-39.9): ICD-10-CM

## 2021-08-27 DIAGNOSIS — Z12.11 COLON CANCER SCREENING: ICD-10-CM

## 2021-08-27 DIAGNOSIS — Z23 HIGH PRIORITY FOR 2019-NCOV VACCINE: ICD-10-CM

## 2021-08-27 PROCEDURE — 91301 COVID-19,PF,MODERNA: CPT | Performed by: FAMILY MEDICINE

## 2021-08-27 PROCEDURE — 99386 PREV VISIT NEW AGE 40-64: CPT | Mod: 25 | Performed by: FAMILY MEDICINE

## 2021-08-27 PROCEDURE — 90472 IMMUNIZATION ADMIN EACH ADD: CPT | Performed by: FAMILY MEDICINE

## 2021-08-27 PROCEDURE — 90732 PPSV23 VACC 2 YRS+ SUBQ/IM: CPT | Performed by: FAMILY MEDICINE

## 2021-08-27 PROCEDURE — 90471 IMMUNIZATION ADMIN: CPT | Performed by: FAMILY MEDICINE

## 2021-08-27 PROCEDURE — 0011A COVID-19,PF,MODERNA: CPT | Performed by: FAMILY MEDICINE

## 2021-08-27 PROCEDURE — 90750 HZV VACC RECOMBINANT IM: CPT | Performed by: FAMILY MEDICINE

## 2021-08-27 ASSESSMENT — MIFFLIN-ST. JEOR: SCORE: 1909.94

## 2021-08-27 ASSESSMENT — PAIN SCALES - GENERAL: PAINLEVEL: NO PAIN (0)

## 2021-08-27 NOTE — PROGRESS NOTES
SUBJECTIVE:   CC: Bill Vogel is an 51 year old male who presents for preventative health visit.       Patient has been advised of split billing requirements and indicates understanding: Yes  Healthy Habits:    Getting at least 3 servings of Calcium per day:  NO    Bi-annual eye exam:  NO    Dental care twice a year:  NO    Sleep apnea or symptoms of sleep apnea:  None    Diet:  Regular (no restrictions)    Frequency of exercise:  None    Duration of exercise:  N/A    Taking medications regularly:  No    Barriers to taking medications:  Not applicable    Medication side effects:  Not applicable    PHQ-2 Total Score:    Additional concerns today:  Yes      Skin lesion on the left temple.  Bothersome to him as it is where his glasses rest along side his face.  Disrupts it with removal and replacement of glasses.          Today's PHQ-2 Score:   PHQ-2 ( 1999 Pfizer) 8/27/2021   Q1: Little interest or pleasure in doing things 0   Q2: Feeling down, depressed or hopeless 0   PHQ-2 Score 0       Abuse: Current or Past(Physical, Sexual or Emotional)- No  Do you feel safe in your environment? Yes    Have you ever done Advance Care Planning? (For example, a Health Directive, POLST, or a discussion with a medical provider or your loved ones about your wishes): No, advance care planning information given to patient to review.  Patient declined advance care planning discussion at this time.    Social History     Tobacco Use     Smoking status: Current Every Day Smoker     Packs/day: 0.10     Years: 22.00     Pack years: 2.20     Smokeless tobacco: Former User     Quit date: 12/2/2015   Substance Use Topics     Alcohol use: No     Comment: sober since 6/20/89     If you drink alcohol do you typically have >3 drinks per day or >7 drinks per week? No    No flowsheet data found.    Reviewed orders with patient. Reviewed health maintenance and updated orders accordingly - Yes    Reviewed and updated as needed this visit by clinical  staff  Tobacco  Allergies  Meds   Med Hx  Surg Hx  Fam Hx  Soc Hx        Reviewed and updated as needed this visit by Provider                  Review of Systems  CONSTITUTIONAL: NEGATIVE for fever, chills, change in weight  INTEGUMENTARY/SKIN: NEGATIVE for worrisome rashes, moles or lesions except for skin lesion noted above.  EYES: NEGATIVE for vision changes or irritation  ENT: NEGATIVE for ear, mouth and throat problems  RESP: NEGATIVE for significant cough or SOB  CV: NEGATIVE for chest pain, palpitations or peripheral edema  GI: NEGATIVE for nausea, abdominal pain, heartburn, or change in bowel habits   male: negative for dysuria, hematuria, decreased urinary stream, erectile dysfunction, urethral discharge  MUSCULOSKELETAL: NEGATIVE for significant arthralgias or myalgia  NEURO: NEGATIVE for weakness, dizziness or paresthesias  PSYCHIATRIC: NEGATIVE for changes in mood or affect    OBJECTIVE:   /86   Pulse 100   Temp 98.2  F (36.8  C) (Temporal)   Resp 20   Ht 1.829 m (6')   Wt 102.7 kg (226 lb 6.4 oz)   SpO2 95%   BMI 30.71 kg/m      Physical Exam  Constitutional:       General: He is not in acute distress.     Appearance: He is well-developed.   HENT:      Head: Normocephalic and atraumatic.      Right Ear: Hearing, tympanic membrane, ear canal and external ear normal.      Left Ear: Hearing, tympanic membrane, ear canal and external ear normal.      Nose: Nose normal.      Mouth/Throat:      Mouth: No oral lesions.      Pharynx: Uvula midline. No oropharyngeal exudate.   Eyes:      General: Lids are normal. No scleral icterus.        Right eye: No discharge.         Left eye: No discharge.      Conjunctiva/sclera: Conjunctivae normal.      Pupils: Pupils are equal, round, and reactive to light.   Neck:      Thyroid: No thyroid mass or thyromegaly.      Trachea: No tracheal deviation.   Cardiovascular:      Rate and Rhythm: Normal rate and regular rhythm.      Pulses: Normal pulses.       Heart sounds: Normal heart sounds, S1 normal and S2 normal. No murmur heard.   No S3 or S4 sounds.    Pulmonary:      Effort: Pulmonary effort is normal. No respiratory distress.      Breath sounds: Normal breath sounds. No wheezing or rales.   Abdominal:      General: Bowel sounds are normal. There is no distension.      Palpations: Abdomen is soft. There is no mass.      Tenderness: There is no abdominal tenderness. There is no guarding.   Musculoskeletal:         General: No deformity. Normal range of motion.      Cervical back: Normal range of motion and neck supple.   Lymphadenopathy:      Cervical: No cervical adenopathy.      Upper Body:      Right upper body: No supraclavicular adenopathy.      Left upper body: No supraclavicular adenopathy.   Skin:     General: Skin is warm and dry.      Findings: Lesion (left temple has well demarcated brown pigmented stuck on lesion consistent with SK) present. No rash.      Comments: Left temple   Neurological:      Mental Status: He is alert and oriented to person, place, and time.      Motor: No abnormal muscle tone.      Deep Tendon Reflexes: Reflexes are normal and symmetric.   Psychiatric:         Speech: Speech normal.         Thought Content: Thought content normal.         Judgment: Judgment normal.           ASSESSMENT/PLAN:     ASSESSMENT/ORDERS:    ICD-10-CM    1. Routine general medical examination at a health care facility  Z00.00 Lipid panel reflex to direct LDL Fasting   2. High priority for 2019-nCoV vaccine  Z23 COVID-19,PF,MODERNA   3. Tobacco abuse  Z72.0    4. Obesity (BMI 30-39.9)  E66.9 GLUCOSE   5. Colon cancer screening  Z12.11 Adult Gastro Ref - Procedure Only   6. Encounter for screening for human immunodeficiency virus (HIV)  Z11.4 HIV Antigen Antibody Combo   7. Need for hepatitis C screening test  Z11.59 Hepatitis C antibody   8. Seborrheic keratosis  L82.1      PLAN:  1.  Recommended skin lesion removal.  He will follow-up in 1 month for  this and will get second COVID-19 vaccine at the same time.     Patient has been advised of split billing requirements and indicates understanding: Yes  COUNSELING:   Reviewed preventive health counseling, as reflected in patient instructions    Estimated body mass index is 30.71 kg/m  as calculated from the following:    Height as of this encounter: 1.829 m (6').    Weight as of this encounter: 102.7 kg (226 lb 6.4 oz).     Weight management plan: Discussed healthy diet and exercise guidelines    He reports that he has been smoking. He has a 2.20 pack-year smoking history. He quit smokeless tobacco use about 5 years ago.  Tobacco Cessation Action Plan:   Information offered: Patient not interested at this time      Counseling Resources:  ATP IV Guidelines  Pooled Cohorts Equation Calculator  FRAX Risk Assessment  ICSI Preventive Guidelines  Dietary Guidelines for Americans, 2010  USDA's MyPlate  ASA Prophylaxis  Lung CA Screening    Norris Fay MD  [facility]

## 2021-09-24 ENCOUNTER — OFFICE VISIT (OUTPATIENT)
Dept: FAMILY MEDICINE | Facility: CLINIC | Age: 51
End: 2021-09-24
Payer: COMMERCIAL

## 2021-09-24 VITALS
HEART RATE: 76 BPM | TEMPERATURE: 96.8 F | DIASTOLIC BLOOD PRESSURE: 76 MMHG | SYSTOLIC BLOOD PRESSURE: 118 MMHG | BODY MASS INDEX: 30.81 KG/M2 | OXYGEN SATURATION: 94 % | WEIGHT: 227.2 LBS | RESPIRATION RATE: 16 BRPM

## 2021-09-24 DIAGNOSIS — Z11.59 NEED FOR HEPATITIS C SCREENING TEST: ICD-10-CM

## 2021-09-24 DIAGNOSIS — L82.1 SK (SEBORRHEIC KERATOSIS): Primary | ICD-10-CM

## 2021-09-24 DIAGNOSIS — Z11.4 ENCOUNTER FOR SCREENING FOR HUMAN IMMUNODEFICIENCY VIRUS (HIV): ICD-10-CM

## 2021-09-24 DIAGNOSIS — Z00.00 ROUTINE GENERAL MEDICAL EXAMINATION AT A HEALTH CARE FACILITY: ICD-10-CM

## 2021-09-24 DIAGNOSIS — E66.9 OBESITY (BMI 30-39.9): ICD-10-CM

## 2021-09-24 LAB
CHOLEST SERPL-MCNC: 185 MG/DL
FASTING STATUS PATIENT QL REPORTED: YES
FASTING STATUS PATIENT QL REPORTED: YES
GLUCOSE BLD-MCNC: 111 MG/DL (ref 70–99)
HCV AB SERPL QL IA: NONREACTIVE
HDLC SERPL-MCNC: 39 MG/DL
HIV 1+2 AB+HIV1 P24 AG SERPL QL IA: NONREACTIVE
LDLC SERPL CALC-MCNC: 114 MG/DL
NONHDLC SERPL-MCNC: 146 MG/DL
TRIGL SERPL-MCNC: 162 MG/DL

## 2021-09-24 PROCEDURE — 0012A PR COVID VAC MODERNA 100 MCG/0.5 ML IM: CPT | Performed by: FAMILY MEDICINE

## 2021-09-24 PROCEDURE — 36415 COLL VENOUS BLD VENIPUNCTURE: CPT | Performed by: FAMILY MEDICINE

## 2021-09-24 PROCEDURE — 17110 DESTRUCTION B9 LES UP TO 14: CPT | Performed by: FAMILY MEDICINE

## 2021-09-24 PROCEDURE — 91301 PR COVID VAC MODERNA 100 MCG/0.5 ML IM: CPT | Performed by: FAMILY MEDICINE

## 2021-09-24 PROCEDURE — 80061 LIPID PANEL: CPT | Performed by: FAMILY MEDICINE

## 2021-09-24 PROCEDURE — 86803 HEPATITIS C AB TEST: CPT | Performed by: FAMILY MEDICINE

## 2021-09-24 PROCEDURE — 87389 HIV-1 AG W/HIV-1&-2 AB AG IA: CPT | Performed by: FAMILY MEDICINE

## 2021-09-24 PROCEDURE — 82947 ASSAY GLUCOSE BLOOD QUANT: CPT | Performed by: FAMILY MEDICINE

## 2021-09-24 RX ORDER — FEXOFENADINE HCL 180 MG/1
180 TABLET ORAL DAILY
COMMUNITY

## 2021-09-24 ASSESSMENT — PAIN SCALES - GENERAL: PAINLEVEL: NO PAIN (0)

## 2021-09-24 NOTE — PROGRESS NOTES
Assessment & Plan     ASSESSMENT/ORDERS:    ICD-10-CM    1. SK (seborrheic keratosis)  L82.1 DESTRUCT BENIGN LESION, UP TO 14     PLAN:  1.  Lesion treated with cryo using liquid nitrogen in three freeze/thaw cycles. Should fall off on its own in a a week or two.                 Return in about 1 year (around 9/24/2022) for next preventative visit (Physical).    Norris Fay MD  [facility] ELSA Khan is a 51 year old who presents for the following health issues     HPI     Chief Complaint   Patient presents with     Lesion Removal     remove lesion on left side of head       Here for treatment of the lesion on left side of head identified last month.  It bothers him because it hurts when he wears his glasses due to its location.    Review of Systems         Objective    /76   Pulse 76   Temp 96.8  F (36  C) (Temporal)   Resp 16   Wt 103.1 kg (227 lb 3.2 oz)   SpO2 94%   BMI 30.81 kg/m    Body mass index is 30.81 kg/m .  Physical Exam  Skin:     Findings: Lesion (left temple has well demarcated brown pigmented stuck on lesion consistent with SK) present.   Neurological:      Mental Status: He is alert.

## 2021-10-05 PROBLEM — R73.01 ELEVATED FASTING BLOOD SUGAR: Status: ACTIVE | Noted: 2021-10-05

## 2021-10-05 NOTE — RESULT ENCOUNTER NOTE
Bill,  Your results show your triglycerides and glucose are slightly elevated and HDL (good cholesterol) is slightly low.  This can be improved by increasing your daily aerobic exercise.  Please let me know if you have any questions or you can set up an appointment to discuss this in more detail.    Sincerely,  Dr. Fya

## 2022-02-05 ENCOUNTER — HOSPITAL ENCOUNTER (EMERGENCY)
Facility: CLINIC | Age: 52
Discharge: HOME | End: 2022-02-05
Attending: FAMILY MEDICINE | Admitting: FAMILY MEDICINE
Payer: COMMERCIAL

## 2022-02-05 VITALS
HEART RATE: 72 BPM | DIASTOLIC BLOOD PRESSURE: 102 MMHG | SYSTOLIC BLOOD PRESSURE: 129 MMHG | OXYGEN SATURATION: 98 % | WEIGHT: 220 LBS | TEMPERATURE: 98.1 F | RESPIRATION RATE: 20 BRPM | BODY MASS INDEX: 29.84 KG/M2

## 2022-02-05 DIAGNOSIS — M54.16 LUMBAR RADICULOPATHY: ICD-10-CM

## 2022-02-05 PROCEDURE — 99284 EMERGENCY DEPT VISIT MOD MDM: CPT | Performed by: FAMILY MEDICINE

## 2022-02-05 RX ORDER — PREDNISONE 20 MG/1
60 TABLET ORAL DAILY
Qty: 15 TABLET | Refills: 0 | Status: SHIPPED | OUTPATIENT
Start: 2022-02-05 | End: 2023-12-08

## 2022-02-05 RX ORDER — CYCLOBENZAPRINE HCL 10 MG
10 TABLET ORAL 3 TIMES DAILY PRN
Qty: 20 TABLET | Refills: 0 | Status: SHIPPED | OUTPATIENT
Start: 2022-02-05 | End: 2022-02-11

## 2022-02-05 RX ORDER — HYDROCODONE BITARTRATE AND ACETAMINOPHEN 5; 325 MG/1; MG/1
1 TABLET ORAL EVERY 6 HOURS PRN
Qty: 15 TABLET | Refills: 0 | Status: SHIPPED | OUTPATIENT
Start: 2022-02-05 | End: 2023-12-08

## 2022-02-05 RX ORDER — IBUPROFEN 800 MG/1
800 TABLET, FILM COATED ORAL EVERY 8 HOURS PRN
COMMUNITY
End: 2023-12-09

## 2022-02-05 NOTE — ED TRIAGE NOTES
Pt reports right leg pain that started in his buttocks and has gotten increasingly worse and has moved down to his calf.

## 2022-02-05 NOTE — ED PROVIDER NOTES
History     Chief Complaint   Patient presents with     Leg Pain     HPI  Bill Vogel is a 51 year old male who presents with right leg pain that started and his gluteal area but now is radiating all the way down to the calf.  Movement makes the pain worse.  He is having a hard time even sitting on the toilet because of the pain.  Denies any current back pain.  Patient states this started after shoveling a lot of snow on an embankment the other day.  Denies any trauma.  Denies any bowel or bladder incontinence.  Denies any saddle anesthesia.  Nothing is tried at home is really helped with the pain.    Allergies:  Allergies   Allergen Reactions     No Known Drug Allergies      Seasonal Allergies        Problem List:    Patient Active Problem List    Diagnosis Date Noted     Elevated fasting blood sugar 10/05/2021     Priority: Medium     Tobacco abuse      Priority: Medium        Past Medical History:    Past Medical History:   Diagnosis Date     Tobacco abuse        Past Surgical History:    Past Surgical History:   Procedure Laterality Date     ORTHOPEDIC SURGERY         Family History:    Family History   Problem Relation Age of Onset     Depression Mother      Breast Cancer Mother      Depression Sister        Social History:  Marital Status:   [2]  Social History     Tobacco Use     Smoking status: Current Every Day Smoker     Packs/day: 0.10     Years: 22.00     Pack years: 2.20     Types: Vaping Device     Smokeless tobacco: Former User     Quit date: 12/2/2015   Vaping Use     Vaping Use: Every day     Substances: Nicotine     Devices: Pre-filled or refillable cartridge, Refillable tank   Substance Use Topics     Alcohol use: No     Comment: sober since 6/20/89     Drug use: Not Currently     Types: Marijuana     Comment: drug addiction, free of usx16 years        Medications:    cyclobenzaprine (FLEXERIL) 10 MG tablet  HYDROcodone-acetaminophen (NORCO) 5-325 MG tablet  ibuprofen (ADVIL/MOTRIN) 800  MG tablet  predniSONE (DELTASONE) 20 MG tablet  fexofenadine (ALLEGRA) 180 MG tablet          Review of Systems   All other systems reviewed and are negative.      Physical Exam   BP: (!) 129/102  Pulse: 72  Temp: 98.1  F (36.7  C)  Resp: 20  Weight: 99.8 kg (220 lb)  SpO2: 98 %      Physical Exam  Vitals and nursing note reviewed.   Constitutional:       General: He is not in acute distress.     Appearance: Normal appearance. He is not ill-appearing.   Musculoskeletal:      Right lower leg: No swelling, tenderness or bony tenderness.   Neurological:      Mental Status: He is alert.         ED Course                 Procedures      No results found for this or any previous visit (from the past 24 hour(s)).    Medications - No data to display     Exam and history seems consistent with lumbar radiculopathy.  We will start the patient on prednisone for the next 5 days and patient was sent home with some Flexeril and Norco for breakthrough pain.  We will have patient follow-up with his doctor if things or not improving over the next 4 to 5 days to consider an outpatient MRI and/or referral for physical therapy.    Assessments & Plan (with Medical Decision Making)  Lumbar radiculopathy     I have reviewed the nursing notes.    I have reviewed the findings, diagnosis, plan and need for follow up with the patient.      New Prescriptions    CYCLOBENZAPRINE (FLEXERIL) 10 MG TABLET    Take 1 tablet (10 mg) by mouth 3 times daily as needed for muscle spasms    HYDROCODONE-ACETAMINOPHEN (NORCO) 5-325 MG TABLET    Take 1 tablet by mouth every 6 hours as needed for severe pain    PREDNISONE (DELTASONE) 20 MG TABLET    Take 3 tablets (60 mg) by mouth daily       Final diagnoses:   Lumbar radiculopathy       2/5/2022   [facility] EMERGENCY DEPT     Oh Centeno MD  02/05/22 9560

## 2022-08-07 ENCOUNTER — HOSPITAL ENCOUNTER (EMERGENCY)
Facility: CLINIC | Age: 52
Discharge: HOME | End: 2022-08-07
Attending: EMERGENCY MEDICINE | Admitting: EMERGENCY MEDICINE
Payer: COMMERCIAL

## 2022-08-07 VITALS
WEIGHT: 209 LBS | OXYGEN SATURATION: 99 % | HEIGHT: 71 IN | RESPIRATION RATE: 18 BRPM | SYSTOLIC BLOOD PRESSURE: 122 MMHG | BODY MASS INDEX: 29.26 KG/M2 | HEART RATE: 68 BPM | DIASTOLIC BLOOD PRESSURE: 85 MMHG

## 2022-08-07 DIAGNOSIS — M54.50 LUMBAR BACK PAIN: ICD-10-CM

## 2022-08-07 PROCEDURE — 99284 EMERGENCY DEPT VISIT MOD MDM: CPT | Performed by: EMERGENCY MEDICINE

## 2022-08-07 RX ORDER — CYCLOBENZAPRINE HCL 10 MG
10 TABLET ORAL 3 TIMES DAILY PRN
Qty: 20 TABLET | Refills: 0 | Status: SHIPPED | OUTPATIENT
Start: 2022-08-07 | End: 2022-08-13

## 2022-08-07 RX ORDER — METHYLPREDNISOLONE 4 MG
TABLET, DOSE PACK ORAL
Qty: 21 TABLET | Refills: 0 | Status: SHIPPED | OUTPATIENT
Start: 2022-08-07 | End: 2023-12-08

## 2022-08-07 RX ORDER — HYDROCODONE BITARTRATE AND ACETAMINOPHEN 5; 325 MG/1; MG/1
1 TABLET ORAL EVERY 6 HOURS PRN
Qty: 10 TABLET | Refills: 0 | Status: SHIPPED | OUTPATIENT
Start: 2022-08-07 | End: 2022-08-10

## 2022-08-07 NOTE — DISCHARGE INSTRUCTIONS
I suspect you have some muscle spasm of the low back and buttock area.  You may also have some irritation of the nerve causing the pain down your leg.  I would like to treat this 3 ways first with a muscle relaxant.  Also a steroid pack for its anti-inflammatory properties.  Thirdly you can take ibuprofen or Vicodin for additional pain relief.  If you have persistence of symptoms after completion of the Medrol pack or definitely if you are having worsening symptoms either return to the ER or follow-up with your doctor as you may need advanced imaging like an MRI or even some physical therapy.

## 2022-08-07 NOTE — ED PROVIDER NOTES
"  History     Chief Complaint   Patient presents with     Back Pain     HPI  Bill Vogel is a 52 year old male who presents with ongoing and worsening low back pain.  Patient has a previous history of low back issues with right leg radiculopathy.  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  Recently he bent over and felt a \"zinging\" in his low back and since that time has had worsening low back pain with intermittent left leg pain.  Currently does not have any radicular leg pain bilaterally.  No saddle paresthesias or loss of bowel or bladder.  He was treated previously with muscle relaxants and pain meds with relief.  He has not had any advanced imaging.  He has not had physical therapy.  Denies recent illness.  He has had no fever or chills.  No diarrhea or dysuria.  No rashes on the legs.  No treatment for his pain prior to arrival today.  Patient is a smoker.    Allergies:  Allergies   Allergen Reactions     No Known Drug Allergies      Seasonal Allergies        Problem List:    Patient Active Problem List    Diagnosis Date Noted     Elevated fasting blood sugar 10/05/2021     Priority: Medium     Tobacco abuse      Priority: Medium        Past Medical History:    Past Medical History:   Diagnosis Date     Tobacco abuse        Past Surgical History:    Past Surgical History:   Procedure Laterality Date     ORTHOPEDIC SURGERY         Family History:    Family History   Problem Relation Age of Onset     Depression Mother      Breast Cancer Mother      Depression Sister        Social History:  Marital Status:   [2]  Social History     Tobacco Use     Smoking status: Current Every Day Smoker     Packs/day: 0.10     Years: 22.00     Pack years: 2.20     Types: Vaping Device     Smokeless tobacco: Former User     Quit date: 12/2/2015   Vaping Use     Vaping Use: Every day     " "Substances: Nicotine     Devices: Pre-filled or refillable cartridge, Refillable tank   Substance Use Topics     Alcohol use: No     Comment: sober since 6/20/89     Drug use: Not Currently     Types: Marijuana     Comment: drug addiction, free of usx16 years        Medications:    cyclobenzaprine (FLEXERIL) 10 MG tablet  HYDROcodone-acetaminophen (NORCO) 5-325 MG tablet  methylPREDNISolone (MEDROL DOSEPAK) 4 MG tablet therapy pack  fexofenadine (ALLEGRA) 180 MG tablet  HYDROcodone-acetaminophen (NORCO) 5-325 MG tablet  ibuprofen (ADVIL/MOTRIN) 800 MG tablet  predniSONE (DELTASONE) 20 MG tablet          Review of Systems all other systems are reviewed and are negative.    Physical Exam   BP: 122/85  Pulse: 68  Resp: 18  Height: 180.3 cm (5' 11\")  Weight: 94.8 kg (209 lb)  SpO2: 99 %      Physical Exam alert male in mild to moderate distress.  When he goes from a lying to sitting position or vice versa he has increased discomfort in his low back especially on the left.  On exam he does not have any tenderness of the midline spine.  No significant muscle spasm in the low back currently.  Does have some tenderness over the SI joints bilaterally and into the left buttock.  With straight leg raising causes increased discomfort in the low back but does not cause radicular symptoms on the left.  On the right he states the leg just feels tight but does not have significant pain or paresthesias.  There is no rash of the back or legs suggesting shingles.  Intact lower extremity strength and DTRs are equal    ED Course                 Procedures              Critical Care time:  none               No results found for this or any previous visit (from the past 24 hour(s)).    Medications - No data to display    Assessments & Plan (with Medical Decision Making)   Bill Vogel is a 52 year old male who presents with ongoing and worsening low back pain.  Patient has a previous history of low back issues with right leg " "radiculopathy.  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  Recently he bent over and felt a \"zinging\" in his low back and since that time has had worsening low back pain with intermittent left leg pain.  Currently does not have any radicular leg pain bilaterally.  No saddle paresthesias or loss of bowel or bladder.  He was treated previously with muscle relaxants and pain meds with relief.  He has not had any advanced imaging.  He has not had physical therapy.  Denies recent illness.  He has had no fever or chills.  No diarrhea or dysuria.  No rashes on the legs.  No treatment for his pain prior to arrival today.  Patient is a smoker.  On exam patient does have some tenderness in the lumbar region and left buttock.  No rashes and no midline bony tenderness.  Straight leg raising causes increased discomfort in the low back but no radicular symptoms.  He has intact strength and DTRs are equal.  Suspect he has muscle spasm but cannot rule out irritation of the nerves of the lumbar region.  He will be given a Medrol pack for its anti-inflammatory properties.  Flexeril for spasm.  He can take ibuprofen or Vicodin for pain.  If he has persistence of symptoms after completion of the Medrol pack I have asked him to follow-up with his doctor for reassessment, consider physical therapy or advanced imaging.  If he has worsening symptoms such as leg weakness, loss of bowel or bladder or saddle paresthesias he is to return to the ER immediately  I have reviewed the nursing notes.    I have reviewed the findings, diagnosis, plan and need for follow up with the patient.       New Prescriptions    CYCLOBENZAPRINE (FLEXERIL) 10 MG TABLET    Take 1 tablet (10 mg) by mouth 3 times daily as needed    HYDROCODONE-ACETAMINOPHEN (NORCO) 5-325 MG TABLET    Take 1 tablet by mouth every 6 hours as needed for " severe pain    METHYLPREDNISOLONE (MEDROL DOSEPAK) 4 MG TABLET THERAPY PACK    Follow Package Directions       Final diagnoses:   Lumbar back pain       8/7/2022   [facility] EMERGENCY DEPT     Clement Garza MD  08/07/22 0946

## 2022-08-07 NOTE — ED TRIAGE NOTES
Pt presents with concerns of low back pain.  Pt states that the pain started on Friday.  Pt states that the pain goes down his right leg.  Pt states that day he bend over when he felt a twinge.  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  Pt states he has had this pain before. Nothing for pain prior to arrival today.      Triage Assessment     Row Name 08/07/22 0909       Triage Assessment (Adult)    Airway WDL WDL       Respiratory WDL    Respiratory WDL WDL       Skin Circulation/Temperature WDL    Skin Circulation/Temperature WDL WDL       Cardiac WDL    Cardiac WDL WDL       Peripheral/Neurovascular WDL    Peripheral Neurovascular WDL WDL       Cognitive/Neuro/Behavioral WDL    Cognitive/Neuro/Behavioral WDL WDL

## 2022-11-16 ENCOUNTER — HOSPITAL ENCOUNTER (EMERGENCY)
Facility: CLINIC | Age: 52
Discharge: HOME | End: 2022-11-16
Attending: FAMILY MEDICINE | Admitting: FAMILY MEDICINE
Payer: OTHER MISCELLANEOUS

## 2022-11-16 VITALS
SYSTOLIC BLOOD PRESSURE: 126 MMHG | OXYGEN SATURATION: 96 % | HEART RATE: 77 BPM | DIASTOLIC BLOOD PRESSURE: 94 MMHG | TEMPERATURE: 97.6 F | RESPIRATION RATE: 16 BRPM

## 2022-11-16 DIAGNOSIS — H16.133 FLASH BURN OF BOTH EYES: ICD-10-CM

## 2022-11-16 PROCEDURE — 99284 EMERGENCY DEPT VISIT MOD MDM: CPT | Performed by: FAMILY MEDICINE

## 2022-11-16 PROCEDURE — 99283 EMERGENCY DEPT VISIT LOW MDM: CPT | Performed by: FAMILY MEDICINE

## 2022-11-16 PROCEDURE — 250N000009 HC RX 250: Performed by: FAMILY MEDICINE

## 2022-11-16 RX ORDER — TETRACAINE HYDROCHLORIDE 5 MG/ML
1-2 SOLUTION OPHTHALMIC
Status: DISCONTINUED | OUTPATIENT
Start: 2022-11-16 | End: 2022-11-16 | Stop reason: HOSPADM

## 2022-11-16 RX ADMIN — TETRACAINE HYDROCHLORIDE 2 DROP: 5 SOLUTION OPHTHALMIC at 03:23

## 2022-11-16 ASSESSMENT — VISUAL ACUITY
OS: 20/25
OS: NOT TESTED
OD: NOT TESTED
OD: 20/20

## 2022-11-16 ASSESSMENT — ACTIVITIES OF DAILY LIVING (ADL): ADLS_ACUITY_SCORE: 35
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
## 2022-11-16 NOTE — ED TRIAGE NOTES
Patient presents with concerns for bilateral eye pain. 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 Monday night started noticing pain. Tuesday night pain has worsened. Attempted to use lubricating drops which made the burning worse. Describes it as sandpaper and burning when he blinks.     Triage Assessment     Row Name 11/16/22 0247       Triage Assessment (Adult)    Airway WDL WDL       Respiratory WDL    Respiratory WDL WDL       Skin Circulation/Temperature WDL    Skin Circulation/Temperature WDL WDL       Cardiac WDL    Cardiac WDL WDL       Peripheral/Neurovascular WDL    Peripheral Neurovascular WDL WDL       Cognitive/Neuro/Behavioral WDL    Cognitive/Neuro/Behavioral WDL WDL       [place] Coma Scale    Best Eye Response 4-->(E4) spontaneous    Best Motor Response 6-->(M6) obeys commands    Best Verbal Response 5-->(V5) oriented    [place] Coma Scale Score 15

## 2023-12-08 ENCOUNTER — HOSPITAL ENCOUNTER (EMERGENCY)
Facility: CLINIC | Age: 53
Discharge: HOME | End: 2023-12-09
Attending: FAMILY MEDICINE | Admitting: FAMILY MEDICINE
Payer: COMMERCIAL

## 2023-12-08 VITALS
RESPIRATION RATE: 16 BRPM | HEART RATE: 68 BPM | HEIGHT: 70 IN | BODY MASS INDEX: 30.06 KG/M2 | TEMPERATURE: 97.8 F | SYSTOLIC BLOOD PRESSURE: 137 MMHG | OXYGEN SATURATION: 97 % | WEIGHT: 210 LBS | DIASTOLIC BLOOD PRESSURE: 89 MMHG
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
## 2023-12-08 DIAGNOSIS — R07.81 RIB PAIN ON LEFT SIDE: ICD-10-CM

## 2023-12-08 PROCEDURE — 99283 EMERGENCY DEPT VISIT LOW MDM: CPT | Performed by: FAMILY MEDICINE

## 2023-12-08 PROCEDURE — 99284 EMERGENCY DEPT VISIT MOD MDM: CPT | Performed by: FAMILY MEDICINE

## 2023-12-08 RX ORDER — LIDOCAINE 4 G/G
1 PATCH TOPICAL ONCE
Status: DISCONTINUED | OUTPATIENT
Start: 2023-12-08 | End: 2023-12-09 | Stop reason: HOSPADM

## 2023-12-08 ASSESSMENT — ACTIVITIES OF DAILY LIVING (ADL): ADLS_ACUITY_SCORE: 35

## 2023-12-09 ENCOUNTER — APPOINTMENT (OUTPATIENT)
Dept: GENERAL RADIOLOGY | Facility: CLINIC | Age: 53
End: 2023-12-09
Attending: FAMILY MEDICINE
Payer: COMMERCIAL

## 2023-12-09 PROCEDURE — 71101 X-RAY EXAM UNILAT RIBS/CHEST: CPT | Mod: LT

## 2023-12-09 PROCEDURE — 250N000013 HC RX MED GY IP 250 OP 250 PS 637: Performed by: FAMILY MEDICINE

## 2023-12-09 RX ORDER — OXYCODONE HYDROCHLORIDE 5 MG/1
5-10 TABLET ORAL EVERY 6 HOURS PRN
Qty: 12 TABLET | Refills: 0 | Status: SHIPPED | OUTPATIENT
Start: 2023-12-09

## 2023-12-09 RX ORDER — LIDOCAINE 4 G/G
1 PATCH TOPICAL EVERY 12 HOURS PRN
COMMUNITY
Start: 2023-12-09

## 2023-12-09 RX ORDER — ACETAMINOPHEN 500 MG
500-1000 TABLET ORAL EVERY 6 HOURS PRN
COMMUNITY
Start: 2023-12-09 | End: 2023-12-13

## 2023-12-09 RX ORDER — CYCLOBENZAPRINE HCL 10 MG
10 TABLET ORAL 3 TIMES DAILY PRN
Qty: 15 TABLET | Refills: 0 | Status: SHIPPED | OUTPATIENT
Start: 2023-12-09

## 2023-12-09 RX ORDER — IBUPROFEN 200 MG
600 TABLET ORAL EVERY 6 HOURS PRN
COMMUNITY
Start: 2023-12-09 | End: 2023-12-12

## 2023-12-09 RX ADMIN — LIDOCAINE 1 PATCH: 4 PATCH TOPICAL at 00:35

## 2023-12-09 ASSESSMENT — ENCOUNTER SYMPTOMS
ABDOMINAL PAIN: 0
FEVER: 0
WOUND: 0
COLOR CHANGE: 0

## 2023-12-09 NOTE — DISCHARGE INSTRUCTIONS
Take deep breaths on occasion to prevent pneumonia from setting and from the splinting of your breathing efforts caused by the pain in the rib cage area.

## 2023-12-09 NOTE — ED PROVIDER NOTES
History     Chief Complaint   Patient presents with    Rib Pain     HPI  Bill Vogel is a 53 year old male who presents to the emergency room today secondary concerns of left-sided rib pain.  Patient states that he fell 5 days ago on ice and snow landing on his left side with his elbow tucked in his side.  He states that he felt a pop in the lateral left rib cage and has had difficulty sleeping since.  He states this is the second night that he has been unable to tolerate sleeping because of the pain so decided to come in to get checked.  He denies shortness of breath but states the pain is worse with deep breathing.  He denies any other injury associated with the fall last Monday.  He does admit to a history of prior falls with rib fractures several years ago.      I did query the [place] controlled substance database. The patient was not listed as having filled a control prescription in the last year.    Allergies:  Allergies   Allergen Reactions    No Known Drug Allergy     Seasonal Allergies        Problem List:    Patient Active Problem List    Diagnosis Date Noted    Elevated fasting blood sugar 10/05/2021     Priority: Medium    Tobacco abuse      Priority: Medium        Past Medical History:    Past Medical History:   Diagnosis Date    Tobacco abuse        Past Surgical History:    Past Surgical History:   Procedure Laterality Date    ORTHOPEDIC SURGERY         Family History:    Family History   Problem Relation Age of Onset    Depression Mother     Breast Cancer Mother     Depression Sister        Social History:  Marital Status:   [2]  Social History     Tobacco Use    Smoking status: Every Day     Packs/day: 0.10     Years: 22.00     Additional pack years: 0.00     Total pack years: 2.20     Types: Vaping Device, Cigarettes    Smokeless tobacco: Former     Quit date: 12/2/2015   Vaping Use    Vaping Use: Every day    Substances: Nicotine    Devices: Pre-filled or refillable cartridge,  "[employer]ble tank   Substance Use Topics    Alcohol use: No     Comment: sober since 6/20/89    Drug use: Not Currently     Types: Marijuana     Comment: drug addiction, free of usx16 years        Medications:    acetaminophen (TYLENOL) 500 MG tablet  cyclobenzaprine (FLEXERIL) 10 MG tablet  fexofenadine (ALLEGRA) 180 MG tablet  ibuprofen (ADVIL/MOTRIN) 200 MG tablet  Lidocaine (LIDOCARE) 4 % Patch  oxyCODONE (ROXICODONE) 5 MG tablet          Review of Systems   Constitutional:  Negative for fever.   Cardiovascular:  Positive for chest pain (Left lateral rib cage area pain worse with movement and deep breathing.).   Gastrointestinal:  Negative for abdominal pain.   Skin:  Negative for color change, pallor, rash and wound.   All other systems reviewed and are negative.      Physical Exam   BP: 137/89  Pulse: 68  Temp: 97.8  F (36.6  C)  Resp: 16  Height: 177.8 cm (5' 10\")  Weight: 95.3 kg (210 lb)  SpO2: 97 %      Physical Exam  Vitals and nursing note reviewed.   Constitutional:       General: He is in acute distress.   Cardiovascular:      Rate and Rhythm: Normal rate.      Pulses: Normal pulses.   Pulmonary:      Effort: Respiratory distress present.   Chest:      Chest wall: Tenderness (left lateral chest pain) present. No mass, lacerations, deformity, swelling or edema.       Musculoskeletal:      Cervical back: Normal range of motion and neck supple.   Skin:     Capillary Refill: Capillary refill takes less than 2 seconds.      Findings: No bruising, lesion or rash.   Neurological:      Mental Status: He is alert and oriented to person, place, and time.   Psychiatric:         Mood and Affect: Mood normal.         Behavior: Behavior normal.         ED Course                 Procedures              Critical Care time:  none               Results for orders placed or performed during the hospital encounter of 12/08/23 (from the past 24 hour(s))   Ribs XR, unilat 3 views + PA chest,  left    Narrative    EXAM: XR " RIBS AND CHEST LEFT 3 VIEWS  LOCATION: [facility]  DATE: 12/9/2023    INDICATION: fall on ice, left lateral rib pain  COMPARISON: 01/03/2017.    FINDINGS: Frontal chest and 3 views of the left ribs. The heart size is normal. There is mild left basilar infiltrate. The lungs are otherwise clear. No pneumothorax. There are fractures of the posterior left eighth and ninth ribs which may be old. No   definite acute rib fracture.      Impression    IMPRESSION: Left posterior eighth and ninth rib fractures may be old. No definite acute fracture. No pneumothorax.       Medications - No data to display Lidoderm patch applied to the left lateral/posterior chest wall      Assessments & Plan (with Medical Decision Making)  53-year-old male to the ER secondary concerns of left lateral chest wall pain making it difficult for him to sleep for the last several nights after a fall on the ice that occurred 5 days ago.  Patient states that he has a history for prior rib fractures from a fall several years ago.  His exam findings today with significant tenderness to the left lateral rib cage without gross deformity or flail chest noted.  Lung sounds are clear to auscultation throughout.  X-ray examination of the left chest wall and lung showed evidence of prior healed fractures but no acute fractures identified and no evidence for hemo or pneumothorax noted.  Patient had a Lidoderm patch applied over the area of maximal tenderness to the chest wall he was instructed appropriate use of this medication.  We discussed the need for taking deep breaths to prevent atelectasis and subsequent secondary pneumonias.  Additional medication prescribed for pain control.  To return for increase or worsening symptoms as noted on the handouts discussing rib injury sent home with him today.     I have reviewed the nursing notes.    I have reviewed the findings, diagnosis, plan and need for follow up with the patient.            Discharge Medication List as of 12/9/2023 12:55 AM        START taking these medications    Details   acetaminophen (TYLENOL) 500 MG tablet Take 1-2 tablets (500-1,000 mg) by mouth every 6 hours as needed for pain, OTC      cyclobenzaprine (FLEXERIL) 10 MG tablet Take 1 tablet (10 mg) by mouth 3 times daily as needed for muscle spasms, Disp-15 tablet, R-0, [facility]      Lidocaine (LIDOCARE) 4 % Patch Place 1 patch onto the skin every 12 hours as needed for moderate pain (apply the patch over the area of pain) Salon Pas is the brand name of the patch and can be purchased without a prescription.OTC      oxyCODONE (ROXICODONE) 5 MG tablet Take 1-2 tablets (5-10 mg) by mouth every 6 hours as needed for severe pain, Disp-12 tablet, R-0, [facility]                  I verbally discussed the findings of the evaluation today in the ER. I have verbally discussed with Bill the suggested treatment(s) as described in the discharge instructions and handouts. I have prescribed the above listed medications and instructed him on appropriate use of these medications.      I have verbally suggested he follow-up in his clinic or return to the ER for increased symptoms. See the follow-up recommendations documented  in the after visit summary in this visit's EPIC chart.      Disclaimer: This note consists of words and symbols derived from keyboarding and dictation using voice recognition software.  As a result, there may be errors that have gone undetected.  Please consider this when interpreting information found in this note.    Final diagnoses:   Rib pain on left side   Fall due to slipping on ice or snow, initial encounter       12/8/2023   [facility] EMERGENCY DEPT       Gadiel Johnson, DO  12/09/23 1738     Yes

## 2023-12-09 NOTE — ED TRIAGE NOTES
Fell last week and has left sided rib pain      Triage Assessment (Adult)       Row Name 12/08/23 8262          Triage Assessment    Airway WDL WDL        Respiratory WDL    Respiratory WDL WDL        Skin Circulation/Temperature WDL    Skin Circulation/Temperature WDL WDL

## 2025-03-17 ENCOUNTER — VIRTUAL VISIT (OUTPATIENT)
Dept: FAMILY MEDICINE | Facility: OTHER | Age: 55
End: 2025-03-17
Payer: COMMERCIAL

## 2025-03-17 DIAGNOSIS — H10.31 ACUTE BACTERIAL CONJUNCTIVITIS OF RIGHT EYE: Primary | ICD-10-CM

## 2025-03-17 PROCEDURE — 98004 SYNCH AUDIO-VIDEO EST SF 10: CPT | Performed by: PHYSICIAN ASSISTANT

## 2025-03-17 RX ORDER — POLYMYXIN B SULFATE AND TRIMETHOPRIM 1; 10000 MG/ML; [USP'U]/ML
1-2 SOLUTION OPHTHALMIC EVERY 4 HOURS
Qty: 10 ML | Refills: 0 | Status: SHIPPED | OUTPATIENT
Start: 2025-03-17

## 2025-03-17 NOTE — PROGRESS NOTES
Bill is a 54 year old who is being evaluated via a billable video visit.    How would you like to obtain your AVS? MyChart  If the video visit is dropped, the invitation should be resent by: Text to cell phone: [phone]  Will anyone else be joining your video visit? No      Assessment & Plan     Acute bacterial conjunctivitis of right eye  Patient reports 3 days of discharge and eye stuck shut in the AM. I will start him on topical antibiotic drops and have him reach out to myself or PCP if not improving. Reviewed possible adverse effects. Attached reference material to AVS.   - polymixin b-trimethoprim (POLYTRIM) 19576-9.1 UNIT/ML-% ophthalmic solution; Place 1-2 drops into the right eye every 4 hours.    Subjective   Bill is a 54 year old, presenting for the following health issues:  Stye / Hordeolum Evaluation (Right)         No data to display              HPI      Has been waking with discharge in his eye for the past 3 days. Started with a little stye in right eye. Now getting stuck shut.     Review of Systems  Constitutional, HEENT, cardiovascular, pulmonary, gi and gu systems are negative, except as otherwise noted.      Objective           Vitals:  No vitals were obtained today due to virtual visit.    Physical Exam   GENERAL: alert and no distress  EYES: right eye difficult to visualized through video, injected   RESP: No audible wheeze, cough, or visible cyanosis.    SKIN: Visible skin clear. No significant rash, abnormal pigmentation or lesions.  NEURO: Cranial nerves grossly intact.  Mentation and speech appropriate for age.  PSYCH: Appropriate affect, tone, and pace of words      Video-Visit Details    Type of service:  Video Visit   Originating Location (pt. Location): Home    Distant Location (provider location):  Off-site  Platform used for Video Visit: Jami  Signed Electronically by: Jamaal Perea PA-C